# Patient Record
Sex: FEMALE | Race: ASIAN
[De-identification: names, ages, dates, MRNs, and addresses within clinical notes are randomized per-mention and may not be internally consistent; named-entity substitution may affect disease eponyms.]

---

## 2020-03-10 ENCOUNTER — HOSPITAL ENCOUNTER (OUTPATIENT)
Dept: HOSPITAL 46 - OPS | Age: 65
Discharge: HOME | End: 2020-03-10
Attending: SURGERY
Payer: MEDICARE

## 2020-03-10 VITALS — WEIGHT: 147 LBS | HEIGHT: 64 IN | BODY MASS INDEX: 25.1 KG/M2

## 2020-03-10 DIAGNOSIS — F32.9: ICD-10-CM

## 2020-03-10 DIAGNOSIS — Z88.0: ICD-10-CM

## 2020-03-10 DIAGNOSIS — Z79.899: ICD-10-CM

## 2020-03-10 DIAGNOSIS — K57.30: ICD-10-CM

## 2020-03-10 DIAGNOSIS — K64.8: ICD-10-CM

## 2020-03-10 DIAGNOSIS — Z12.11: Primary | ICD-10-CM

## 2020-03-10 DIAGNOSIS — Z88.5: ICD-10-CM

## 2020-03-10 DIAGNOSIS — Z98.890: ICD-10-CM

## 2020-03-10 DIAGNOSIS — G25.0: ICD-10-CM

## 2020-03-10 PROCEDURE — 0DJD8ZZ INSPECTION OF LOWER INTESTINAL TRACT, VIA NATURAL OR ARTIFICIAL OPENING ENDOSCOPIC: ICD-10-PCS | Performed by: SURGERY

## 2020-03-10 PROCEDURE — G0500 MOD SEDAT ENDO SERVICE >5YRS: HCPCS

## 2020-03-10 NOTE — NUR
03/10/20 [facility] [place] [address]-PT ARRIVES TO PACU ON 2
L VIA NC. PT REACTIVE AND ENC TO PASS GAS IF NEEDED TO AVOID
CRAMPING. PT AGREEABLE. VSS. SATS 100%.

## 2020-03-11 NOTE — OR
[facility]
                                    [address], [place]  [address]
_________________________________________________________________________________________
                                                                 Signed   
 
 
DATE OF OPERATION:
03/10/2020
 
SURGEON:
Nanci Basurto MD
 
PREOPERATIVE DIAGNOSES:
1. Unremarkable colonoscopy in 2009 (age 54).
2. Mother with history of colonic polyps.
 
POSTOPERATIVE DIAGNOSES:
1. Minimal-to-moderate sigmoid diverticulosis.
2. Minimal-to-moderate internal hemorrhoids.
 
PROCEDURE:
Colonoscopy without biopsy.
 
ESTIMATED BLOOD LOSS:
None.
 
INDICATIONS:
Abida is a 64-year-old female, who was asked to see me for a colonoscopy.  At the age of
54 in 2009, she had an unremarkable colonoscopy with [facility] in the [place],
[place] area.  More recently, she has no lower GI complaints.  However, her mother has
a history of colonic polyps.  In that case, she would generally come every 5 years for
repeat colonoscopy.  I had given her a pamphlet on colonoscopy and we looked at that
together in detail.  I explained to her that colonic polyps can become cancers over 8-12
years.  We also reviewed the need for IV conscious sedation.  She had expressed
understanding and wished to proceed. 
 
PROCEDURE NOTE:
Abida was taken into our endoscopy suite and placed in the left lateral decubitus
position.  She was given IV sedation with 6 mg of Versed and 150 mcg of fentanyl.  A
digital rectal exam had been undertaken and this was unremarkable.  The adult
colonoscope was introduced and advanced under direct visualization of the camera.  Abida
is slight of build at 5 feet 4 inches, 147 pounds.  Consequently, her colon is somewhat
smaller in diameter than other adults.  It took a few minutes to get up through the
sigmoid colon and into the left colon itself.  After that, the colon opened up quite
nicely.  Because of this, she did take some extra sedation and some abdominal
compression.  Fortunately, her prep was quite good.  After that, the scope passed quite
nicely right into the cecum itself.  We could easily see the appendiceal orifice and the
ileocecal valve.  The scope was slowly withdrawn.  We took several pictures throughout
 
    Electronically Signed By: NANCI BASURTO MD  03/11/20 0834
_________________________________________________________________________________________
PATIENT NAME:     ABIDA PATEL                         
MEDICAL RECORD #: [id number]            OPERATIVE REPORT              
          ACCT #: [id number]  
DATE OF BIRTH:   [birth year]            REPORT #: [id number]      
PHYSICIAN:        NANCI BASURTO MD             
PCP:              GODFREY LEONARD DO               
REPORT IS CONFIDENTIAL AND NOT TO BE RELEASED WITHOUT AUTHORIZATION
 
 
                                  [facility]
                                    [address], [place]  [address]
_________________________________________________________________________________________
                                                                 Signed   
 
 
for photodocumentation.  Once in the rectum, the scope had been retroflexed and she has
very minimal-to-moderate internal hemorrhoid tissue.  She had a few diverticula in the
sigmoid colon.  They were minimal-to-moderate in size, minimal-to-moderate in number,
and scattered about.  There were no polyps.  After this, the gas was suctioned out and
colonoscope removed.  Abida tolerated the procedure quite well. 
 
RECOMMENDATIONS:
Abida can follow up in 5 years for repeat colonoscopy due to her mother's history of
colonic polyps. 
 
 
 
            ________________________________________
            Nanci Basurto MD 
 
 
ALB/ROSEYL
Job #:  [id number]
DD:  03/10/2020 10:43:41
DT:  03/10/2020 11:19:11
 
cc:            DO Nanci Cobb MD
 
 
Copies:  GODFREY LEONARD ANDREW L MD
~
 
 
 
 
 
 
 
 
 
 
 
 
 
 
    Electronically Signed By: NANCI BASURTO MD  03/11/20 0834
_________________________________________________________________________________________
PATIENT NAME:     ABIDA PATEL                         
MEDICAL RECORD #: [id number]            OPERATIVE REPORT              
          ACCT #: [id number]  
DATE OF BIRTH:   [birth year]            REPORT #: [id number]      
PHYSICIAN:        NANCI BASURTO MD             
PCP:              GODFREY LEONARD DO               
REPORT IS CONFIDENTIAL AND NOT TO BE RELEASED WITHOUT AUTHORIZATION